# Patient Record
Sex: FEMALE | Race: WHITE
[De-identification: names, ages, dates, MRNs, and addresses within clinical notes are randomized per-mention and may not be internally consistent; named-entity substitution may affect disease eponyms.]

---

## 2021-01-13 ENCOUNTER — HOSPITAL ENCOUNTER (OUTPATIENT)
Dept: HOSPITAL 63 - DXRAD | Age: 81
End: 2021-01-13
Attending: PHYSICIAN ASSISTANT
Payer: MEDICARE

## 2021-01-13 DIAGNOSIS — M25.761: ICD-10-CM

## 2021-01-13 DIAGNOSIS — M17.11: Primary | ICD-10-CM

## 2021-01-13 PROCEDURE — 73562 X-RAY EXAM OF KNEE 3: CPT

## 2021-01-14 NOTE — RAD
EXAM: 3 views right knee



DATE: 1/13/2021 3:41 PM



INDICATION: RIGHT KNEE PAIN 



COMPARISON: No Prior



FINDINGS:

Mild medial compartment joint space narrowing with rightward osteophytes. Chondrocalcinosis. Patellar
 enthesopathy. No significant right knee joint effusion.No evidence of acute fracture or dislocation.




IMPRESSION:

1.  Degenerative changes of the medial compartment without evidence for acute fracture or dislocation
. 

2.  No evidence of acute fracture or dislocation.



Electronically signed by: Anurag Francois MD (1/14/2021 2:26 AM) [email or web address]

## 2021-10-26 ENCOUNTER — HOSPITAL ENCOUNTER (OUTPATIENT)
Dept: HOSPITAL 63 - RAD | Age: 81
End: 2021-10-26
Attending: PHYSICIAN ASSISTANT
Payer: MEDICARE

## 2021-10-26 DIAGNOSIS — M12.88: ICD-10-CM

## 2021-10-26 DIAGNOSIS — M46.04: ICD-10-CM

## 2021-10-26 DIAGNOSIS — M43.17: ICD-10-CM

## 2021-10-26 DIAGNOSIS — M47.812: ICD-10-CM

## 2021-10-26 DIAGNOSIS — M48.54XA: ICD-10-CM

## 2021-10-26 DIAGNOSIS — M41.85: Primary | ICD-10-CM

## 2021-10-26 PROCEDURE — 72072 X-RAY EXAM THORAC SPINE 3VWS: CPT

## 2021-10-26 PROCEDURE — 72100 X-RAY EXAM L-S SPINE 2/3 VWS: CPT

## 2021-10-26 NOTE — RAD
EXAM: Thoracic spine, 3 views; lumbar spine, 3 views.



HISTORY: Pain.



COMPARISON: None.



FINDINGS: Thoracic spine: 3 views of the thoracic spine are obtained. There is mild thoracic scoliosi
s. There is a moderate right greater than left anterior wedge compression fracture with minimal retro
pulsion of the cortex at T11. No additional fracture is seen. There is multilevel endplate remodeling
 and anterior spurring. There is also degenerative change involving the cervical spine, not formally 
assessed on this exam.



Lumbar spine: 3 views of the lumbar spine are obtained. There is minimal lumbar scoliosis. There is 3
 mm grade 1 anterolisthesis of L4 and L5 and L5 on S1. There is multilevel endplate remodeling and fa
cet arthropathy.



IMPRESSION: 

1. Moderate wedge compression fracture of T11 with slight relative motion of the cortex. This is of u
ncertain chronicity. Correlate for pain in this location.

2. 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

3. Mild lumbar scoliosis and mild listhesis at the lower lumbar levels.



Electronically signed by: Angelic Gurrola MD (10/26/2021 4:38 PM) [id number]

## 2021-10-26 NOTE — RAD
EXAM: Thoracic spine, 3 views; lumbar spine, 3 views.



HISTORY: Pain.



COMPARISON: None.



FINDINGS: Thoracic spine: 3 views of the thoracic spine are obtained. There is mild thoracic scoliosi
s. There is a moderate right greater than left anterior wedge compression fracture with minimal retro
pulsion of the cortex at T11. No additional fracture is seen. There is multilevel endplate remodeling
 and anterior spurring. There is also degenerative change involving the cervical spine, not formally 
assessed on this exam.



Lumbar spine: 3 views of the lumbar spine are obtained. There is minimal lumbar scoliosis. There is 3
 mm grade 1 anterolisthesis of L4 and L5 and L5 on S1. There is multilevel endplate remodeling and fa
cet arthropathy.



IMPRESSION: 

1. Moderate wedge compression fracture of T11 with slight relative motion of the cortex. This is of u
ncertain chronicity. Correlate for pain in this location.

2. 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

3. Mild lumbar scoliosis and mild listhesis at the lower lumbar levels.



Electronically signed by: Angelic Gurrola MD (10/26/2021 4:38 PM) [id number]

## 2022-04-09 ENCOUNTER — HOSPITAL ENCOUNTER (OUTPATIENT)
Dept: HOSPITAL 63 - DXRAD | Age: 82
End: 2022-04-09
Attending: PHYSICIAN ASSISTANT
Payer: MEDICARE

## 2022-04-09 DIAGNOSIS — M25.461: Primary | ICD-10-CM

## 2022-04-09 DIAGNOSIS — Z96.651: ICD-10-CM

## 2022-04-09 PROCEDURE — 73562 X-RAY EXAM OF KNEE 3: CPT

## 2022-04-09 NOTE — RAD
3 VIEW STUDY OF THE RIGHT KNEE



Clinical indications: Status post arthroplasty on March 29, 2022. Right knee pain



FINDINGS: Total right knee arthroplasty is evident which is well aligned. Midline metallic skin sutur
es are seen anteriorly. Small right knee joint effusion is seen. No acute fracture or lytic process i
s seen.



IMPRESSION: Total right knee arthroplasty. No acute osseous abnormality.



Electronically signed by: Maurice Collado MD (4/9/2022 9:34 AM) [id number]